# Patient Record
Sex: FEMALE | HISPANIC OR LATINO
[De-identification: names, ages, dates, MRNs, and addresses within clinical notes are randomized per-mention and may not be internally consistent; named-entity substitution may affect disease eponyms.]

---

## 2021-06-18 PROBLEM — Z00.00 ENCOUNTER FOR PREVENTIVE HEALTH EXAMINATION: Status: ACTIVE | Noted: 2021-06-18

## 2021-07-06 ENCOUNTER — APPOINTMENT (OUTPATIENT)
Dept: ENDOCRINOLOGY | Facility: CLINIC | Age: 46
End: 2021-07-06

## 2021-08-03 ENCOUNTER — APPOINTMENT (OUTPATIENT)
Dept: ENDOCRINOLOGY | Facility: CLINIC | Age: 46
End: 2021-08-03
Payer: MEDICAID

## 2021-08-03 VITALS
HEIGHT: 65 IN | SYSTOLIC BLOOD PRESSURE: 122 MMHG | DIASTOLIC BLOOD PRESSURE: 69 MMHG | BODY MASS INDEX: 20.16 KG/M2 | HEART RATE: 71 BPM | WEIGHT: 121 LBS

## 2021-08-03 PROCEDURE — 99072 ADDL SUPL MATRL&STAF TM PHE: CPT

## 2021-08-03 PROCEDURE — 82962 GLUCOSE BLOOD TEST: CPT

## 2021-08-03 PROCEDURE — 99205 OFFICE O/P NEW HI 60 MIN: CPT | Mod: 25

## 2021-08-03 PROCEDURE — 83036 HEMOGLOBIN GLYCOSYLATED A1C: CPT | Mod: QW

## 2021-08-04 LAB
GLUCOSE BLDC GLUCOMTR-MCNC: 290
HBA1C MFR BLD HPLC: 9.6

## 2021-08-05 NOTE — END OF VISIT
[FreeTextEntry3] : All medical record entries made by the Scribe were at my, Dr. Jack Oliveros, direction and personally dictated by me on 08/03/2021. I have reviewed the chart and agree that the record accurately reflects my personal performance of the history, physical exam, assessment and plan. I have also personally directed, reviewed and agreed with the chart.  [Time Spent: ___ minutes] : I have spent [unfilled] minutes of time on the encounter.

## 2021-08-05 NOTE — ADDENDUM
[FreeTextEntry1] : I, Nathaly Porter, acted solely as a scribe for Dr. Jack Oliveros on this date. 08/03/2021.

## 2021-08-05 NOTE — REVIEW OF SYSTEMS
[Dizziness] : dizziness [Weakness] : weakness [Negative] : Heme/Lymph [Blurred Vision] : no blurred vision [Nocturia] : no nocturia [FreeTextEntry2] : weight loss

## 2021-08-05 NOTE — ASSESSMENT
[Importance of Diet and Exercise] : importance of diet and exercise to improve glycemic control, achieve weight loss and improve cardiovascular health [Self Monitoring of Blood Glucose] : self monitoring of blood glucose [Carbohydrate Consistent Diet] : carbohydrate consistent diet [Action and use of Insulin] : action and use of short and long-acting insulin [FreeTextEntry1] : 44 y/o F pt with: \par \par 1. Hx of T2DM, poorly controlled: \par BS fluctuate from 150 and 200. POCT today 290. \par Pt is losing weight.  \par Diabetes treatment goals discussed, including target goals for BP, LDL-c, A1c, and body weight. \par Discussed the importance of BS monitoring, along with targets for pre and post prandial BS. Briefly summarized anti-diabetic medications, including benefits and side effects along with insulin kinetics with pt. \par Emphasized the importance of preventions of DM complication along with cardiomyopathy. \par Recommend pt check BS readings (before and after meals) and continue present DM management, until the assessment of current evaluation is completed. \par Refer pt to see RD and nurse educator for comprehensive DM teaching, including sick days management. \par Recommend the following MDI: Basaglar 25 u qpm, Fiasp 8 u ac +ss as follows:\par BS <100: 0 u\par -150: +1 u\par -200: +2 u\par -250: +3 u\par -300: +4 u \par BS >300: +5 u\par Will order labs today, including A1c, metabolic and lipid profiles. \par \par 2. Thyroid nodules, dx by her internist \par Pt appears to be euthyroid clinically. Currently awaiting thyroid US report. \par \par Return: This Friday

## 2021-08-05 NOTE — PHYSICAL EXAM
[Alert] : alert [No Acute Distress] : no acute distress [Normal Sclera/Conjunctiva] : normal sclera/conjunctiva [Normal Outer Ear/Nose] : the ears and nose were normal in appearance [No Neck Mass] : no neck mass was observed [No Respiratory Distress] : no respiratory distress [Clear to Auscultation] : lungs were clear to auscultation bilaterally [Normal S1, S2] : normal S1 and S2 [Normal Rate] : heart rate was normal [No Edema] : no peripheral edema [Normal Bowel Sounds] : normal bowel sounds [Spine Straight] : spine straight [Normal Gait] : normal gait [Right Foot Was Examined] : right foot ~C was examined [Left Foot Was Examined] : left foot ~C was examined [2+] : 2+ in the dorsalis pedis [Vibration Dec.] : diminished vibratory sensation at the level of the toes [Normal Reflexes] : deep tendon reflexes were 2+ and symmetric [Oriented x3] : oriented to person, place, and time [de-identified] : abdominal lipodystrophy

## 2021-08-05 NOTE — THERAPY
[Today's Date] : [unfilled] [Basaglar] : Basaglar [Flasp] : Flasp [FreeTextEntry9] : 25 u [de-identified] : 8 u

## 2021-08-05 NOTE — HISTORY OF PRESENT ILLNESS
[FreeTextEntry1] : 45 year y/o female pt, with Hx of T2DM (dx age 22) with no known DM related complications, and thyroid nodules presents today to establish endocrine care with me. \par PSHx:  ()\par FHx: DM (uncle)\par Denies FHx of thyroid disorders \par SHx: non-smoker, social ETOH use \par Lifestyle: Walks qd. Eats 3 meals a day. Check BS qd. \par Last funduscopic visit:  \par Last Podiatrist visit: 2021\par NKDA\par Pt has 2 children. Youngest child is 17 y/o. \par Pt received COVID19 vaccine. \par \par 2021\par Pt last saw an endocrinologist 3-4 years ago. Pt has been taking Basaglar for several years. \par Today pt presents with POCT 290, /69, BMI 20.14, feeling tired with c/o occasional dizziness, weakness, and weight loss. Since 2021, pt has noticed she is losing weight. Pt checks BS 1-2 times a week. Today BS is 290. Occasionally BS can be 500s. High BS generally occurs at night. Pt used to have episodes of hypoglycemia at midnight until 2021, when her medications were changed to the current regimen. \par Denies blurry vision, nocturia. \par \par Current Medications: Basaglar 30 u, Admelog 20 u bid(at lunch and dinner), Ramipril, Simvastatin \par \par Labs: \par - 8/3/21: POCT A1c 9.6%\par - 21: s.creat 0.71, LDL-c 80, TSH 0.757\par - 3/24/21: A1c 9.3%

## 2021-08-06 ENCOUNTER — APPOINTMENT (OUTPATIENT)
Dept: ENDOCRINOLOGY | Facility: CLINIC | Age: 46
End: 2021-08-06
Payer: MEDICAID

## 2021-08-06 ENCOUNTER — RESULT CHARGE (OUTPATIENT)
Age: 46
End: 2021-08-06

## 2021-08-06 VITALS
HEART RATE: 86 BPM | WEIGHT: 124 LBS | DIASTOLIC BLOOD PRESSURE: 55 MMHG | BODY MASS INDEX: 20.63 KG/M2 | SYSTOLIC BLOOD PRESSURE: 125 MMHG

## 2021-08-06 PROCEDURE — 99214 OFFICE O/P EST MOD 30 MIN: CPT | Mod: 25

## 2021-08-06 PROCEDURE — 99072 ADDL SUPL MATRL&STAF TM PHE: CPT

## 2021-08-06 PROCEDURE — 82962 GLUCOSE BLOOD TEST: CPT

## 2021-08-06 PROCEDURE — 97802 MEDICAL NUTRITION INDIV IN: CPT

## 2021-08-06 NOTE — PHYSICAL EXAM
[Alert] : alert [No Acute Distress] : no acute distress [Normal Sclera/Conjunctiva] : normal sclera/conjunctiva [Normal Outer Ear/Nose] : the ears and nose were normal in appearance [No Neck Mass] : no neck mass was observed [No Respiratory Distress] : no respiratory distress [Clear to Auscultation] : lungs were clear to auscultation bilaterally [Normal S1, S2] : normal S1 and S2 [Normal Rate] : heart rate was normal [No Edema] : no peripheral edema [Normal Bowel Sounds] : normal bowel sounds [Spine Straight] : spine straight [Normal Gait] : normal gait [Right Foot Was Examined] : right foot ~C was examined [Left Foot Was Examined] : left foot ~C was examined [2+] : 2+ in the dorsalis pedis [Vibration Dec.] : diminished vibratory sensation at the level of the toes [Normal Reflexes] : deep tendon reflexes were 2+ and symmetric [Oriented x3] : oriented to person, place, and time [de-identified] : abdominal lipodystrophy

## 2021-08-06 NOTE — THERAPY
[Today's Date] : [unfilled] [Basaglar] : Basaglar [Flasp] : Flasp [FreeTextEntry9] : 30 u [de-identified] : 11 u ac

## 2021-08-06 NOTE — END OF VISIT
[FreeTextEntry3] : All medical record entries made by the Scribe were at my, Dr. Jack Oliveros, direction and personally dictated by me on 08/06/2021. I have reviewed the chart and agree that the record accurately reflects my personal performance of the history, physical exam, assessment and plan. I have also personally directed, reviewed and agreed with the chart.  [Time Spent: ___ minutes] : I have spent [unfilled] minutes of time on the encounter.

## 2021-08-06 NOTE — ADDENDUM
[FreeTextEntry1] : I, Nathaly Porter, acted solely as a scribe for Dr. Jack Oliveros on this date. 08/06/2021.

## 2021-08-06 NOTE — HISTORY OF PRESENT ILLNESS
[FreeTextEntry1] : 46 y/o female pt, with Hx of T2DM (dx age 22) with no known DM related complications, and thyroid nodules presents today for endocrine f/u.  \par PSHx:  ()\par FHx: DM (uncle)\par Denies FHx of thyroid disorders \par SHx: non-smoker, social ETOH use \par Lifestyle: Walks qd. Eats 3 meals a day. Check BS qd. \par Last funduscopic visit:  \par Last Podiatrist visit: 2021\par NKDA\par Pt has 2 children. Youngest child is 19 y/o. \par Pt received COVID19 vaccine. \par \par 2021\par Pt last saw an endocrinologist 3-4 years ago. Pt has been taking Basaglar for several years. \par Today pt presents with POCT 290, /69, BMI 20.14, feeling tired with c/o occasional dizziness, weakness, and weight loss. Since 2021, pt has noticed she is losing weight. Pt checks BS 1-2 times a week. Today BS is 290. Occasionally BS can be 500s. High BS generally occurs at night. Pt used to have episodes of hypoglycemia at midnight until 2021, when her medications were changed to the current regimen. \par Denies blurry vision, nocturia. \par \par 21\par Today pt presents accompanied by family member with POCT 257, /55, BMI 20.63, feeling well. Pt eats small meal portions. For dinner, pt may eat a plantain. \par Pt brought BS records. \par - FBS: 129, 264\par - 3 hours after breakfast BS: 251 \par - 4 hours after lunch BS: 410 \par Pt has been on insulin for 23 years. \par \par Current Medications: Basaglar 30 u, Admelog 20 u bid(at lunch and dinner), Ramipril, Simvastatin \par \par Labs: \par - 8/3/21: POCT A1c 9.6%\par - 21: s.creat 0.71, LDL-c 80, TSH 0.757\par - 3/24/21: A1c 9.3%

## 2021-08-06 NOTE — ASSESSMENT
[Carbohydrate Consistent Diet] : carbohydrate consistent diet [Importance of Diet and Exercise] : importance of diet and exercise to improve glycemic control, achieve weight loss and improve cardiovascular health [Action and use of Insulin] : action and use of short and long-acting insulin [Self Monitoring of Blood Glucose] : self monitoring of blood glucose [FreeTextEntry1] : 46 y/o F pt with: \par \par 1. Longstanding Hx of T2DM(dx 23 years ago), poorly controlled: \par Continue working on assessment of complications. \par Pt sees severe fluctuations in BS. -220s. PPBS in 300s. Pt states that she eats small food portions.\par Diabetes treatment explained including impact of carbohydrate intake and lack of physical activity. \par Recommend pt to see podiatrist and ophthalmologist. \par Today pt will discuss her nutrition with RD. \par Will increase MDI insulin by 20%. \par Sending labs today \par \par Return: 4 months.

## 2021-08-18 LAB — GLUCOSE BLDC GLUCOMTR-MCNC: 257

## 2021-08-18 RX ORDER — FLASH GLUCOSE SCANNING READER
EACH MISCELLANEOUS
Qty: 1 | Refills: 0 | Status: ACTIVE | COMMUNITY
Start: 2021-08-06 | End: 1900-01-01

## 2021-12-09 ENCOUNTER — APPOINTMENT (OUTPATIENT)
Dept: ENDOCRINOLOGY | Facility: CLINIC | Age: 46
End: 2021-12-09
Payer: MEDICAID

## 2021-12-09 VITALS
WEIGHT: 124 LBS | BODY MASS INDEX: 20.63 KG/M2 | SYSTOLIC BLOOD PRESSURE: 127 MMHG | DIASTOLIC BLOOD PRESSURE: 65 MMHG | HEART RATE: 76 BPM

## 2021-12-09 PROCEDURE — 82962 GLUCOSE BLOOD TEST: CPT

## 2021-12-09 PROCEDURE — 99072 ADDL SUPL MATRL&STAF TM PHE: CPT

## 2021-12-09 PROCEDURE — 99214 OFFICE O/P EST MOD 30 MIN: CPT | Mod: 25

## 2021-12-10 LAB — GLUCOSE BLDC GLUCOMTR-MCNC: 187

## 2021-12-10 NOTE — ADDENDUM
[FreeTextEntry1] : I Marce Blu act soley as a scribe for Dr. Jack Oliveros on this date. 12/09/2021

## 2021-12-10 NOTE — REVIEW OF SYSTEMS
[As Noted in HPI] : as noted in HPI [Fatigue] : fatigue [Negative] : Heme/Lymph [Recent Weight Gain (___ Lbs)] : no recent weight gain [Recent Weight Loss (___ Lbs)] : no recent weight loss [Polyuria] : no polyuria [Dysuria] : no dysuria [Nocturia] : no nocturia

## 2021-12-10 NOTE — END OF VISIT
[FreeTextEntry3] : All medical record entries made by the Scribe were at my, Dr. Jack Oliveros, direction and personally dictated by me on 12/09/2021. I have reviewed the chart and agree that the record accurately reflects my personal performance of the history, physical exam, assessment and plan. I have also personally directed, reviewed and agreed with the chart.  [Time Spent: ___ minutes] : I have spent [unfilled] minutes of time on the encounter.

## 2021-12-10 NOTE — PHYSICAL EXAM
[Alert] : alert [No Acute Distress] : no acute distress [Normal Sclera/Conjunctiva] : normal sclera/conjunctiva [Normal Outer Ear/Nose] : the ears and nose were normal in appearance [No Neck Mass] : no neck mass was observed [No Respiratory Distress] : no respiratory distress [Clear to Auscultation] : lungs were clear to auscultation bilaterally [Normal S1, S2] : normal S1 and S2 [Normal Rate] : heart rate was normal [No Edema] : no peripheral edema [Normal Bowel Sounds] : normal bowel sounds [Spine Straight] : spine straight [Normal Gait] : normal gait [No Rash] : no rash [2+] : 2+ in the dorsalis pedis [Vibration Dec.] : diminished vibratory sensation at the level of the toes [Normal Reflexes] : deep tendon reflexes were 2+ and symmetric [Oriented x3] : oriented to person, place, and time [de-identified] : Left lobe nodularities [de-identified] : abdominal lipodystrophy

## 2021-12-10 NOTE — THERAPY
[Today's Date] : [unfilled] [Basaglar] : Basaglar [Admelog] : Admelog [FreeTextEntry9] : 25 u  [de-identified] : 7 u ac

## 2021-12-10 NOTE — HISTORY OF PRESENT ILLNESS
[FreeTextEntry1] : 45 y/o female pt, with Hx of T2DM (dx age 22) with DM related complications such as peripheral neuropathy and "proteinuria", and thyroid nodules presents today for endocrine f/u. No hx of CAD. \par Other PMHx: UTI (2021)\par PSHx:  ()\par FHx: DM (uncle)\par Denies FHx of thyroid disorders \par SHx: non-smoker, social ETOH use \par Lifestyle: Walks qd. Eats 3 meals a day. Check BS qd. \par Last funduscopic visit:  \par Last Podiatrist visit: 2021\par NKDA\par Pt has 2 children. Youngest child is 17 y/o. \par Pt received COVID19 vaccine. \par \par 2021\par Pt last saw an endocrinologist 3-4 years ago. Pt has been taking Basaglar for several years. \par Today pt presents with POCT 290, /69, BMI 20.14, feeling tired with c/o occasional dizziness, weakness, and weight loss. Since 2021, pt has noticed she is losing weight. Pt checks BS 1-2 times a week. Today BS is 290. Occasionally BS can be 500s. High BS generally occurs at night. Pt used to have episodes of hypoglycemia at midnight until 2021, when her medications were changed to the current regimen. \par Denies blurry vision, nocturia. \par \par 21\par Today pt presents accompanied by family member with POCT 257, /55, BMI 20.63, feeling well. Pt eats small meal portions. For dinner, pt may eat a plantain. \par Pt brought BS records. \par - FBS: 129, 264\par - 3 hours after breakfast BS: 251 \par - 4 hours after lunch BS: 410 \par Pt has been on insulin for 23 years. \par \par 2021\par Pt presents today for DM f/u with POCT 187, /65 and BMI 20.63. No weight changes since last visit.  \par Pt was previously diagnosed with peripheral neuropathy and proteinuria as per pt. No Hx of CAD. She was diagnosed with UTI last week. \par Pt is feeling good, with c/o fatigue. She denies other physical complaints such as osmotic diuresis symptoms. \par Her DM has been poorly controlled. She is on MDI. \par BS: 200-300\par \par \par Current Medications: Basaglar 25 u, Admelog 4u for breakfast, 16u for lunch, 10 for dinner. Ramipril, Simvastatin \par \par Labs: \par - 8/3/21: POCT A1c 9.6%\par - 21: s.creat 0.71, LDL-c 80, TSH 0.757\par - 3/24/21: A1c 9.3% \par \par

## 2021-12-10 NOTE — ASSESSMENT
[Self Monitoring of Blood Glucose] : self monitoring of blood glucose [FreeTextEntry1] : 45 y/o F pt with: \par \par 1. Hx of T2DM(dx 23 years ago), poorly controlled: \par Has history of retinopathy and proteinuria. No past medical history of CAD. \par Her A1c is 9.6% in 08/2021. At her initial visit, sites of injections were changed due to lipodystrophy. She is not following the DM regimen as prescribed. She has light breakfast and large lunch. \par DM treatment goals were explained and recommended modification of lifestyle and diet. She has already seen her RD. Avoid sweet beverages and delaying meals.\par Recommend basal insulin 25 u and prandial insulin 7 u ac. Pt can increase up to 12 u for an unplanned large meals.\par F/u with ophthalmologist and PCP. \par Bring FBS and PPBS records.  \par \par Return: 1 month.  [Carbohydrate Consistent Diet] : carbohydrate consistent diet [Importance of Diet and Exercise] : importance of diet and exercise to improve glycemic control, achieve weight loss and improve cardiovascular health [Exercise/Effect on Glucose] : exercise/effect on glucose

## 2022-02-14 ENCOUNTER — APPOINTMENT (OUTPATIENT)
Dept: ENDOCRINOLOGY | Facility: CLINIC | Age: 47
End: 2022-02-14
Payer: MEDICAID

## 2022-02-14 VITALS
BODY MASS INDEX: 21.16 KG/M2 | WEIGHT: 127 LBS | DIASTOLIC BLOOD PRESSURE: 71 MMHG | HEART RATE: 75 BPM | SYSTOLIC BLOOD PRESSURE: 128 MMHG | HEIGHT: 65 IN

## 2022-02-14 LAB — GLUCOSE BLDC GLUCOMTR-MCNC: 171

## 2022-02-14 PROCEDURE — 99072 ADDL SUPL MATRL&STAF TM PHE: CPT

## 2022-02-14 PROCEDURE — 82962 GLUCOSE BLOOD TEST: CPT

## 2022-02-14 PROCEDURE — 99214 OFFICE O/P EST MOD 30 MIN: CPT | Mod: 25

## 2022-02-18 NOTE — END OF VISIT
[FreeTextEntry3] : All medical record entries made by the Scribe were at my, Dr. Jack Oliveros, direction and personally dictated by me on 02/14/2022. I have reviewed the chart and agree that the record accurately reflects my personal performance of the history, physical exam, assessment and plan. I have also personally directed, reviewed and agreed with the chart.  [Time Spent: ___ minutes] : I have spent [unfilled] minutes of time on the encounter.

## 2022-02-18 NOTE — PHYSICAL EXAM
[Alert] : alert [No Acute Distress] : no acute distress [Normal Sclera/Conjunctiva] : normal sclera/conjunctiva [Normal Outer Ear/Nose] : the ears and nose were normal in appearance [No Respiratory Distress] : no respiratory distress [Clear to Auscultation] : lungs were clear to auscultation bilaterally [Normal Rate] : heart rate was normal [No Edema] : no peripheral edema [Normal Bowel Sounds] : normal bowel sounds [Spine Straight] : spine straight [Normal Gait] : normal gait [No Rash] : no rash [2+] : 2+ in the dorsalis pedis [Vibration Dec.] : diminished vibratory sensation at the level of the toes [Normal Reflexes] : deep tendon reflexes were 2+ and symmetric [Oriented x3] : oriented to person, place, and time [Regular Rhythm] : with a regular rhythm [de-identified] : Left lobe nodularities palpated.  [de-identified] : abdominal lipodystrophy

## 2022-02-18 NOTE — ADDENDUM
[FreeTextEntry1] : I Kieranhoward Hurt act soley as a scribe for Dr. Jack Oliveros on this date. 02/14/2022

## 2022-02-18 NOTE — REVIEW OF SYSTEMS
[Recent Weight Gain (___ Lbs)] : recent weight gain: [unfilled] lbs [As Noted in HPI] : as noted in HPI [Joint Pain] : joint pain [Negative] : Constitutional [Polyuria] : no polyuria [Nocturia] : no nocturia [FreeTextEntry9] : Pt's joints are bothering her. Her hands and lower extremities are tender.

## 2022-02-18 NOTE — ASSESSMENT
[FreeTextEntry1] : 45 y/o F pt with: \par \par 1. Hx of T2DM (dx 23 years ago): \par At her initial visit in 08/2021, her A1c was 9.6%. Over the past few months, the pt has been interested in her DM management and has modified her diet and lifestyle. \par Pt is doing very well clinically. Her FBS range between . \par A recent C-peptide from 01/07/22 was <0.1 with a Glucose of 101. This indicates that after 2 decades with DM, she has exhausted her pancreas. \par Her POCT A1c  today was 9.2%. A way to explain this high A1c is that her PPBS are in the 300s (she only checks her FBS). Therefore, the pt will continue with the same MDI. Recommend to measure her pre and post prandial BS at home.\par Send a copy of her glucose log to change her regimen accordingly. \par For her neuropathy symptoms, we will initiate Cymbalta 30 mg. Continue Simvastatin. \par \par Return: 4 months.  [Carbohydrate Consistent Diet] : carbohydrate consistent diet [Importance of Diet and Exercise] : importance of diet and exercise to improve glycemic control, achieve weight loss and improve cardiovascular health [Self Monitoring of Blood Glucose] : self monitoring of blood glucose

## 2022-02-18 NOTE — THERAPY
[Today's Date] : [unfilled] [Basaglar] : Basaglar [Admelog] : Admelog [FreeTextEntry9] : 25 u  [de-identified] : 7 u ac [FreeTextEntry7] : Simvastatin

## 2022-02-18 NOTE — DATA REVIEWED
[FreeTextEntry1] : Imaging:\par - 07/30/21 Thyroid neck US: R lobe (measuring 5.2 x 1.1 x 2.0 cm, slightly heterogeneous) is without discrete nodules. Isthmus measures 0.1 cm and is normal in size. L lobe (measuring 4.5 x 1.0 x 1.3 cm, slightly heterogeneous) is without discrete nodules. Impression: normal thyroid sonogram without nodules. \par \par Labs: \par - 01/07/22: s.creat 0.85, Glucose 101 (H), serum C-peptide <0.1 (1.1-4.4), Total bilirubin 1.3 (H)\par - 08/03/21: POCT A1c 9.6%\par - 07/21/21: s.creat 0.87, Glucose 336 (H)\par - 05/26/21: s.creat 0.71, LDL-c 80, TSH 0.757\par - 03/24/21: A1c 9.3%, s.creat 0.71, Glucose 179 (H), serum albumin 3.7 (L)\par

## 2022-03-26 LAB — HBA1C MFR BLD HPLC: 9.2

## 2022-06-13 ENCOUNTER — APPOINTMENT (OUTPATIENT)
Dept: ENDOCRINOLOGY | Facility: CLINIC | Age: 47
End: 2022-06-13

## 2022-06-15 ENCOUNTER — APPOINTMENT (OUTPATIENT)
Dept: ENDOCRINOLOGY | Facility: CLINIC | Age: 47
End: 2022-06-15
Payer: MEDICAID

## 2022-06-15 VITALS
HEART RATE: 74 BPM | DIASTOLIC BLOOD PRESSURE: 77 MMHG | SYSTOLIC BLOOD PRESSURE: 127 MMHG | BODY MASS INDEX: 21.97 KG/M2 | WEIGHT: 132 LBS

## 2022-06-15 PROCEDURE — 99214 OFFICE O/P EST MOD 30 MIN: CPT | Mod: 25

## 2022-06-15 PROCEDURE — 82962 GLUCOSE BLOOD TEST: CPT

## 2022-06-17 LAB — GLUCOSE BLDC GLUCOMTR-MCNC: 162

## 2022-06-17 NOTE — ADDENDUM
[FreeTextEntry1] : I Marce Blu act soley as a scribe for Dr. Jack Oliveros on this date. 06/15/2022  none

## 2022-06-17 NOTE — END OF VISIT
[FreeTextEntry3] : All medical record entries made by the Scribe were at my, Dr. Jack Oliveros, direction and personally dictated by me on 06/15/2022. I have reviewed the chart and agree that the record accurately reflects my personal performance of the history, physical exam, assessment and plan. I have also personally directed, reviewed and agreed with the chart.  [Time Spent: ___ minutes] : I have spent [unfilled] minutes of time on the encounter.

## 2022-06-17 NOTE — PHYSICAL EXAM
[Alert] : alert [No Acute Distress] : no acute distress [Normal Sclera/Conjunctiva] : normal sclera/conjunctiva [Normal Outer Ear/Nose] : the ears and nose were normal in appearance [No Respiratory Distress] : no respiratory distress [Clear to Auscultation] : lungs were clear to auscultation bilaterally [Normal Rate] : heart rate was normal [Regular Rhythm] : with a regular rhythm [No Edema] : no peripheral edema [Normal Bowel Sounds] : normal bowel sounds [Spine Straight] : spine straight [Normal Gait] : normal gait [No Rash] : no rash [2+] : 2+ in the dorsalis pedis [Vibration Dec.] : diminished vibratory sensation at the level of the toes [Normal Reflexes] : deep tendon reflexes were 2+ and symmetric [Oriented x3] : oriented to person, place, and time [No Neck Mass] : no neck mass was observed [No Tremors] : no tremors [Acanthosis Nigricans] : no acanthosis nigricans [de-identified] : abdominal lipodystrophy

## 2022-06-17 NOTE — ASSESSMENT
[FreeTextEntry1] : 47 y/o F pt with: \par \par 1. T2DM diagnosed > 20 years ago: \par January 7, 2022 C-peptide less than 0.1. 2/14/22 A1c 9.2%\par Overall, pt is feeling and doing better. FBS are consistently below 130 with no hypoglycemias.PPBS after dinner continues high. Her neuropathy symptoms have improved significantly. \par Recommend to continue with Basaglar 25 u, increase Admelog to 8/8/10 u ac.\par Peripheral neuropathy, symptoms improved with duloxetine \par Cymbalta 30 mg, and continue statins\par Send labs today. Will contact pt with results.  \par \par Return in: 4 months.  [Carbohydrate Consistent Diet] : carbohydrate consistent diet [Importance of Diet and Exercise] : importance of diet and exercise to improve glycemic control, achieve weight loss and improve cardiovascular health [Exercise/Effect on Glucose] : exercise/effect on glucose [Self Monitoring of Blood Glucose] : self monitoring of blood glucose

## 2022-06-17 NOTE — REVIEW OF SYSTEMS
[As Noted in HPI] : as noted in HPI [Recent Weight Gain (___ Lbs)] : recent weight gain: [unfilled] lbs [Negative] : Musculoskeletal [FreeTextEntry2] : She gained 5 lbs in 4 months.

## 2022-06-17 NOTE — THERAPY
[Today's Date] : [unfilled] [Basaglar] : Basaglar [Admelog] : Admelog [FreeTextEntry9] : 25 u  [de-identified] : 8/8/10 u ac [FreeTextEntry7] : Simvastatin

## 2022-06-25 ENCOUNTER — TRANSCRIPTION ENCOUNTER (OUTPATIENT)
Age: 47
End: 2022-06-25

## 2022-10-06 ENCOUNTER — APPOINTMENT (OUTPATIENT)
Dept: ENDOCRINOLOGY | Facility: CLINIC | Age: 47
End: 2022-10-06

## 2022-10-06 VITALS
DIASTOLIC BLOOD PRESSURE: 61 MMHG | WEIGHT: 138 LBS | HEART RATE: 79 BPM | SYSTOLIC BLOOD PRESSURE: 128 MMHG | BODY MASS INDEX: 22.96 KG/M2

## 2022-10-06 PROCEDURE — 99214 OFFICE O/P EST MOD 30 MIN: CPT | Mod: 25

## 2022-10-06 PROCEDURE — 82962 GLUCOSE BLOOD TEST: CPT

## 2022-10-06 RX ORDER — INSULIN ASPART INJECTION 100 [IU]/ML
100 INJECTION, SOLUTION SUBCUTANEOUS
Qty: 1 | Refills: 2 | Status: DISCONTINUED | COMMUNITY
Start: 2021-08-19 | End: 2022-10-06

## 2022-10-06 NOTE — THERAPY
[Today's Date] : [unfilled] [Basaglar] : Basaglar [Admelog] : Admelog [FreeTextEntry9] : 18 u  [de-identified] : 8 u ac +ss: \par BS <100: +0 u\par -150: + 2 u\par -200: + 3 u\par -250: + 5 u\par BS Above 250: + 7 u \par  [FreeTextEntry7] : Atorvastatin 20 mg

## 2022-10-06 NOTE — PHYSICAL EXAM
[Alert] : alert [No Acute Distress] : no acute distress [Normal Sclera/Conjunctiva] : normal sclera/conjunctiva [Normal Outer Ear/Nose] : the ears and nose were normal in appearance [No Neck Mass] : no neck mass was observed [No Respiratory Distress] : no respiratory distress [Clear to Auscultation] : lungs were clear to auscultation bilaterally [Normal Rate] : heart rate was normal [Regular Rhythm] : with a regular rhythm [No Edema] : no peripheral edema [Normal Bowel Sounds] : normal bowel sounds [Spine Straight] : spine straight [Normal Gait] : normal gait [No Rash] : no rash [2+] : 2+ in the dorsalis pedis [Vibration Dec.] : diminished vibratory sensation at the level of the toes [Normal Reflexes] : deep tendon reflexes were 2+ and symmetric [No Tremors] : no tremors [Oriented x3] : oriented to person, place, and time [Acanthosis Nigricans] : no acanthosis nigricans [de-identified] : abdominal lipodystrophy

## 2022-10-06 NOTE — DATA REVIEWED
[FreeTextEntry1] : Labs: \par - 01/07/22: s.creat 0.85, Glucose 101 (H), serum C-peptide <0.1 (1.1-4.4), Total bilirubin 1.3 (H)\par - 08/03/21: POCT A1c 9.6%\par - 07/21/21: s.creat 0.87, Glucose 336 (H)\par - 05/26/21: s.creat 0.71, LDL-c 80, TSH 0.757\par - 03/24/21: A1c 9.3%, s.creat 0.71, Glucose 179 (H), serum albumin 3.7 (L)\par \par Imaging:\par - 07/30/21 Thyroid neck US: R lobe (measuring 5.2 x 1.1 x 2.0 cm, slightly heterogeneous) is without discrete nodules. Isthmus measures 0.1 cm and is normal in size. L lobe (measuring 4.5 x 1.0 x 1.3 cm, slightly heterogeneous) is without discrete nodules. Impression: normal thyroid sonogram without nodules. \par

## 2022-10-06 NOTE — ADDENDUM
[FreeTextEntry1] : I Kieranhoward Hurt act soley as a scribe for Dr. Jack Oliveros on this date. 10/06/2022

## 2022-10-06 NOTE — HISTORY OF PRESENT ILLNESS
[Continuous Glucose Monitoring] : Continuous Glucose Monitoring: Yes [Sha] : Sha [Hypoglycemia] : Patient is hypoglycemic. [FreeTextEntry1] : ~ 4 times a month.

## 2022-10-06 NOTE — ASSESSMENT
[Carbohydrate Consistent Diet] : carbohydrate consistent diet [Importance of Diet and Exercise] : importance of diet and exercise to improve glycemic control, achieve weight loss and improve cardiovascular health [Exercise/Effect on Glucose] : exercise/effect on glucose [Self Monitoring of Blood Glucose] : self monitoring of blood glucose

## 2022-10-06 NOTE — END OF VISIT
[FreeTextEntry3] : All medical record entries made by the Scribe were at my, Dr. Jack Oliveros, direction and personally dictated by me on 10/06/2022. I have reviewed the chart and agree that the record accurately reflects my personal performance of the history, physical exam, assessment and plan. I have also personally directed, reviewed and agreed with the chart.  [Time Spent: ___ minutes] : I have spent [unfilled] minutes of time on the encounter.

## 2022-10-06 NOTE — REVIEW OF SYSTEMS
[As Noted in HPI] : as noted in HPI [Recent Weight Gain (___ Lbs)] : recent weight gain: [unfilled] lbs [Negative] : Heme/Lymph [Recent Weight Loss (___ Lbs)] : no recent weight loss [Blurred Vision] : no blurred vision [Shortness Of Breath] : no shortness of breath [FreeTextEntry2] : She gained 6 lbs in 4 months.

## 2022-11-07 LAB
ALBUMIN SERPL ELPH-MCNC: 4.3 G/DL
ALP BLD-CCNC: 67 U/L
ALT SERPL-CCNC: 39 U/L
ANION GAP SERPL CALC-SCNC: 12 MMOL/L
AST SERPL-CCNC: 26 U/L
BILIRUB SERPL-MCNC: 1 MG/DL
BUN SERPL-MCNC: 11 MG/DL
CALCIUM SERPL-MCNC: 9.3 MG/DL
CHLORIDE SERPL-SCNC: 99 MMOL/L
CHOLEST SERPL-MCNC: 233 MG/DL
CO2 SERPL-SCNC: 26 MMOL/L
CREAT SERPL-MCNC: 0.57 MG/DL
CREAT SPEC-SCNC: 86 MG/DL
EGFR: 113 ML/MIN/1.73M2
ESTIMATED AVERAGE GLUCOSE: 255 MG/DL
GLUCOSE BLDC GLUCOMTR-MCNC: 85
GLUCOSE SERPL-MCNC: 88 MG/DL
HBA1C MFR BLD HPLC: 10.5 %
HDLC SERPL-MCNC: 80 MG/DL
LDLC SERPL CALC-MCNC: 138 MG/DL
MICROALBUMIN 24H UR DL<=1MG/L-MCNC: 1.9 MG/DL
MICROALBUMIN/CREAT 24H UR-RTO: 22 MG/G
NONHDLC SERPL-MCNC: 153 MG/DL
POTASSIUM SERPL-SCNC: 4 MMOL/L
PROT SERPL-MCNC: 7.5 G/DL
SODIUM SERPL-SCNC: 137 MMOL/L
TRIGL SERPL-MCNC: 75 MG/DL

## 2022-11-22 ENCOUNTER — APPOINTMENT (OUTPATIENT)
Dept: ENDOCRINOLOGY | Facility: CLINIC | Age: 47
End: 2022-11-22

## 2022-11-22 PROCEDURE — 99213 OFFICE O/P EST LOW 20 MIN: CPT | Mod: 95

## 2022-11-22 NOTE — PHYSICAL EXAM
[Alert] : alert [No Acute Distress] : no acute distress [Normal Sclera/Conjunctiva] : normal sclera/conjunctiva [Normal Outer Ear/Nose] : the ears and nose were normal in appearance [No Neck Mass] : no neck mass was observed [No Respiratory Distress] : no respiratory distress [Clear to Auscultation] : lungs were clear to auscultation bilaterally [Normal Rate] : heart rate was normal [Regular Rhythm] : with a regular rhythm [No Edema] : no peripheral edema [Normal Bowel Sounds] : normal bowel sounds [Spine Straight] : spine straight [Normal Gait] : normal gait [No Rash] : no rash [2+] : 2+ in the dorsalis pedis [Vibration Dec.] : diminished vibratory sensation at the level of the toes [Normal Reflexes] : deep tendon reflexes were 2+ and symmetric [No Tremors] : no tremors [Oriented x3] : oriented to person, place, and time [Acanthosis Nigricans] : no acanthosis nigricans [de-identified] : abdominal lipodystrophy

## 2022-11-25 NOTE — THERAPY
[Today's Date] : [unfilled] [Basaglar] : Basaglar [Admelog] : Admelog [FreeTextEntry9] : 18 u  [de-identified] : 8 u ac +ss: \par BS <100: +0 u\par -150: + 2 u\par -200: + 3 u\par -250: + 5 u\par BS Above 250: + 7 u \par  [FreeTextEntry7] : Atorvastatin 20 mg

## 2022-11-25 NOTE — HISTORY OF PRESENT ILLNESS
[Continuous Glucose Monitoring] : Continuous Glucose Monitoring: Yes [Sha] : Sha [Hypoglycemia] : Patient is hypoglycemic. [Home] : at home, [unfilled] , at the time of the visit. [Medical Office: ([facility])___] : at the medical office located in  [FreeTextEntry1] : ~ 4 times a month.

## 2022-11-25 NOTE — END OF VISIT
[FreeTextEntry3] : All medical record entries made by the Scribe were at my, Dr. Jack Oliveros, direction and personally dictated by me on 11/22/2022. I have reviewed the chart and agree that the record accurately reflects my personal performance of the history, physical exam, assessment and plan. I have also personally, directed, reviewed and agreed with the chart  [Time Spent: ___ minutes] : I have spent [unfilled] minutes of time on the encounter.

## 2022-11-25 NOTE — ADDENDUM
[FreeTextEntry1] : I, Leila Velarde, acted solely as a scribe for Dr. Jack Oliveros on this date 11/22/2022

## 2023-03-13 ENCOUNTER — APPOINTMENT (OUTPATIENT)
Dept: ENDOCRINOLOGY | Facility: CLINIC | Age: 48
End: 2023-03-13
Payer: MEDICAID

## 2023-03-13 VITALS
SYSTOLIC BLOOD PRESSURE: 135 MMHG | BODY MASS INDEX: 21.33 KG/M2 | WEIGHT: 128 LBS | HEART RATE: 83 BPM | DIASTOLIC BLOOD PRESSURE: 73 MMHG | HEIGHT: 65 IN

## 2023-03-13 PROCEDURE — 99214 OFFICE O/P EST MOD 30 MIN: CPT | Mod: 25

## 2023-03-13 PROCEDURE — 83036 HEMOGLOBIN GLYCOSYLATED A1C: CPT | Mod: QW

## 2023-03-13 PROCEDURE — 82962 GLUCOSE BLOOD TEST: CPT

## 2023-03-13 RX ORDER — DULOXETINE HYDROCHLORIDE 30 MG/1
30 CAPSULE, DELAYED RELEASE PELLETS ORAL
Qty: 30 | Refills: 2 | Status: ACTIVE | COMMUNITY
Start: 2022-02-14 | End: 1900-01-01

## 2023-03-14 NOTE — END OF VISIT
[FreeTextEntry3] : All medical record entries made by the Scribe were at my, Dr. Jack Oliveros, direction and personally dictated by me on 03/13/2023. I have reviewed the chart and agree that the record accurately reflects my personal performance of the history, physical exam, assessment and plan. I have also personally directed, reviewed and agreed with the chart.  [Time Spent: ___ minutes] : I have spent [unfilled] minutes of time on the encounter.

## 2023-03-14 NOTE — REVIEW OF SYSTEMS
[Negative] : Heme/Lymph [Recent Weight Loss (___ Lbs)] : recent weight loss: [unfilled] lbs [As Noted in HPI] : as noted in HPI [Stress] : stress [Blurred Vision] : no blurred vision [Shortness Of Breath] : no shortness of breath [Nocturia] : no nocturia [FreeTextEntry2] : She lost 10 lbs in 5 months.

## 2023-03-14 NOTE — THERAPY
[Today's Date] : [unfilled] [Basaglar] : Basaglar [Admelog] : Admelog [FreeTextEntry9] : 12 u  [de-identified] : 7u ac +ss: Glucose between: 101-150: 2 units, 151-200: 3 units,  201-250: 5 units,  251-300: 6 units, above 300: 8 units \par BS <100: +0 u\par -150: + 2 u\par -200: + 3 u\par -250: + 5 u\par BS Above 250: + 7 u \par  [FreeTextEntry7] : Atorvastatin 20 mg

## 2023-03-14 NOTE — ADDENDUM
[FreeTextEntry1] : I, Zulema Perry act soley as a scribe for Dr. Jack Oliveros on this date. 03/13/2023

## 2023-03-14 NOTE — DATA REVIEWED
[FreeTextEntry1] : Scanned Labs: \par - 01/07/22: s.creat 0.85, Glucose 101 (H), serum C-peptide <0.1 (1.1-4.4), Total bilirubin 1.3 (H)\par - 07/21/21: s.creat 0.87, Glucose 336 (H)\par - 05/26/21: s.creat 0.71, LDL-c 80, TSH 0.757\par \par \par Imaging:\par - 07/30/21 Thyroid neck US: R lobe (measuring 5.2 x 1.1 x 2.0 cm, slightly heterogeneous) is without discrete nodules. Isthmus measures 0.1 cm and is normal in size. L lobe (measuring 4.5 x 1.0 x 1.3 cm, slightly heterogeneous) is without discrete nodules. Impression: normal thyroid sonogram without nodules. \par

## 2023-03-15 LAB
ANION GAP SERPL CALC-SCNC: 12 MMOL/L
BUN SERPL-MCNC: 13 MG/DL
C PEPTIDE SERPL-MCNC: <0.1 NG/ML
CALCIUM SERPL-MCNC: 10 MG/DL
CHLORIDE SERPL-SCNC: 101 MMOL/L
CHOLEST SERPL-MCNC: 152 MG/DL
CO2 SERPL-SCNC: 26 MMOL/L
CREAT SERPL-MCNC: 0.79 MG/DL
CREAT SPEC-SCNC: 82 MG/DL
EGFR: 92 ML/MIN/1.73M2
ESTIMATED AVERAGE GLUCOSE: 209 MG/DL
GLUCOSE BLDC GLUCOMTR-MCNC: 56
GLUCOSE SERPL-MCNC: 104 MG/DL
HBA1C MFR BLD HPLC: 8.9
HBA1C MFR BLD HPLC: 8.9 %
HDLC SERPL-MCNC: 65 MG/DL
LDLC SERPL CALC-MCNC: 66 MG/DL
MICROALBUMIN 24H UR DL<=1MG/L-MCNC: <1.2 MG/DL
MICROALBUMIN/CREAT 24H UR-RTO: NORMAL MG/G
NONHDLC SERPL-MCNC: 87 MG/DL
POTASSIUM SERPL-SCNC: 4.4 MMOL/L
SODIUM SERPL-SCNC: 139 MMOL/L
TRIGL SERPL-MCNC: 103 MG/DL
TSH SERPL-ACNC: 1.38 UIU/ML

## 2023-03-17 ENCOUNTER — APPOINTMENT (OUTPATIENT)
Dept: ENDOCRINOLOGY | Facility: CLINIC | Age: 48
End: 2023-03-17

## 2023-07-27 ENCOUNTER — NON-APPOINTMENT (OUTPATIENT)
Age: 48
End: 2023-07-27

## 2023-07-27 ENCOUNTER — APPOINTMENT (OUTPATIENT)
Dept: ENDOCRINOLOGY | Facility: CLINIC | Age: 48
End: 2023-07-27
Payer: MEDICAID

## 2023-07-27 VITALS
DIASTOLIC BLOOD PRESSURE: 76 MMHG | HEIGHT: 65 IN | WEIGHT: 136 LBS | BODY MASS INDEX: 22.66 KG/M2 | HEART RATE: 75 BPM | SYSTOLIC BLOOD PRESSURE: 130 MMHG

## 2023-07-27 LAB — GLUCOSE BLDC GLUCOMTR-MCNC: 176

## 2023-07-27 PROCEDURE — 82962 GLUCOSE BLOOD TEST: CPT

## 2023-07-27 PROCEDURE — 99214 OFFICE O/P EST MOD 30 MIN: CPT | Mod: 25

## 2023-07-27 NOTE — THERAPY
[FreeTextEntry9] : 12 u  [de-identified] : 7u ac +ss: Glucose between: 101-150: 2 units, 151-200: 3 units,  201-250: 5 units,  251-300: 6 units, above 300: 8 units \par BS <100: +0 u\par -150: + 2 u\par -200: + 3 u\par -250: + 5 u\par BS Above 250: + 7 u \par  [FreeTextEntry7] : Atorvastatin 20 mg

## 2023-07-27 NOTE — REVIEW OF SYSTEMS
[Blurred Vision] : no blurred vision [Shortness Of Breath] : no shortness of breath [Nocturia] : no nocturia [FreeTextEntry2] : She gained 8 lbs in 4 months

## 2023-07-27 NOTE — DATA REVIEWED
[FreeTextEntry1] : Scanned Labs: \par - 01/07/22: s.creat 0.85, Glucose 101 (H), serum C-peptide <0.1 (1.1-4.4), Total bilirubin 1.3 (H)\par - 07/21/21: s.creat 0.87, Glucose 336 (H)\par - 05/26/21: s.creat 0.71, LDL-c 80, TSH 0.757\par \par Imaging:\par - 07/30/21 Thyroid neck US: R lobe (measuring 5.2 x 1.1 x 2.0 cm, slightly heterogeneous) is without discrete nodules. Isthmus measures 0.1 cm and is normal in size. L lobe (measuring 4.5 x 1.0 x 1.3 cm, slightly heterogeneous) is without discrete nodules. Impression: normal thyroid sonogram without nodules. \par

## 2023-07-27 NOTE — PHYSICAL EXAM
[Acanthosis Nigricans] : no acanthosis nigricans
Manuel Moura)  Cardiovascular Disease; Internal Medicine  [address] [place] [address]  Phone: [phone]  Fax: [phone]  Established Patient  Follow Up Time: 7-10 Days

## 2023-07-27 NOTE — ADDENDUM
[FreeTextEntry1] : I, Tiffany Gonsalves, act soley as a scribe for Dr. Jack Oliveros on this date. 07/27/2023

## 2023-07-27 NOTE — END OF VISIT
[FreeTextEntry3] : All medical record entries made by the Scribe were at my, Dr. Jack Oliveros, direction and personally dictated by me on 07/27/2023. I have reviewed the chart and agree that the record accurately reflects my personal performance of the history, physical exam, assessment and plan. I have also personally directed, reviewed and agreed with the chart.

## 2023-08-02 LAB
ANION GAP SERPL CALC-SCNC: 12 MMOL/L
BUN SERPL-MCNC: 16 MG/DL
CALCIUM SERPL-MCNC: 9.9 MG/DL
CHLORIDE SERPL-SCNC: 99 MMOL/L
CHOLEST SERPL-MCNC: 270 MG/DL
CO2 SERPL-SCNC: 28 MMOL/L
CREAT SERPL-MCNC: 0.65 MG/DL
CREAT SPEC-SCNC: 36 MG/DL
EGFR: 109 ML/MIN/1.73M2
ESTIMATED AVERAGE GLUCOSE: 226 MG/DL
GLUCOSE SERPL-MCNC: 113 MG/DL
HBA1C MFR BLD HPLC: 9.5 %
HDLC SERPL-MCNC: 82 MG/DL
LDLC SERPL CALC-MCNC: 158 MG/DL
MICROALBUMIN 24H UR DL<=1MG/L-MCNC: <1.2 MG/DL
MICROALBUMIN/CREAT 24H UR-RTO: NORMAL MG/G
NONHDLC SERPL-MCNC: 188 MG/DL
POTASSIUM SERPL-SCNC: 4.1 MMOL/L
SODIUM SERPL-SCNC: 139 MMOL/L
TRIGL SERPL-MCNC: 167 MG/DL

## 2024-01-28 ENCOUNTER — TRANSCRIPTION ENCOUNTER (OUTPATIENT)
Age: 49
End: 2024-01-28

## 2024-02-06 ENCOUNTER — APPOINTMENT (OUTPATIENT)
Dept: ENDOCRINOLOGY | Facility: CLINIC | Age: 49
End: 2024-02-06

## 2024-03-07 LAB
ANION GAP SERPL CALC-SCNC: 10 MMOL/L
BUN SERPL-MCNC: 12 MG/DL
CALCIUM SERPL-MCNC: 9.5 MG/DL
CHLORIDE SERPL-SCNC: 97 MMOL/L
CHOLEST SERPL-MCNC: 196 MG/DL
CO2 SERPL-SCNC: 27 MMOL/L
CREAT SERPL-MCNC: 0.63 MG/DL
CREAT SPEC-SCNC: 29 MG/DL
EGFR: 109 ML/MIN/1.73M2
ESTIMATED AVERAGE GLUCOSE: 286 MG/DL
GLUCOSE SERPL-MCNC: 119 MG/DL
HBA1C MFR BLD HPLC: 11.6 %
HDLC SERPL-MCNC: 83 MG/DL
LDLC SERPL CALC-MCNC: 97 MG/DL
MICROALBUMIN 24H UR DL<=1MG/L-MCNC: <1.2 MG/DL
MICROALBUMIN/CREAT 24H UR-RTO: NORMAL MG/G
NONHDLC SERPL-MCNC: 113 MG/DL
POTASSIUM SERPL-SCNC: 3.9 MMOL/L
SODIUM SERPL-SCNC: 135 MMOL/L
TRIGL SERPL-MCNC: 86 MG/DL

## 2024-03-11 ENCOUNTER — APPOINTMENT (OUTPATIENT)
Dept: ENDOCRINOLOGY | Facility: CLINIC | Age: 49
End: 2024-03-11
Payer: COMMERCIAL

## 2024-03-11 VITALS
BODY MASS INDEX: 21.99 KG/M2 | HEIGHT: 65 IN | DIASTOLIC BLOOD PRESSURE: 71 MMHG | SYSTOLIC BLOOD PRESSURE: 132 MMHG | HEART RATE: 74 BPM | WEIGHT: 132 LBS

## 2024-03-11 PROCEDURE — 83036 HEMOGLOBIN GLYCOSYLATED A1C: CPT | Mod: QW

## 2024-03-11 PROCEDURE — 99214 OFFICE O/P EST MOD 30 MIN: CPT | Mod: 25

## 2024-03-11 PROCEDURE — 82962 GLUCOSE BLOOD TEST: CPT

## 2024-03-11 RX ORDER — INSULIN GLARGINE 100 [IU]/ML
100 INJECTION, SOLUTION SUBCUTANEOUS
Qty: 1 | Refills: 1 | Status: ACTIVE | COMMUNITY
Start: 2024-03-11 | End: 1900-01-01

## 2024-03-11 RX ORDER — ATORVASTATIN CALCIUM 20 MG/1
20 TABLET, FILM COATED ORAL DAILY
Qty: 90 | Refills: 2 | Status: ACTIVE | COMMUNITY
Start: 2022-10-06 | End: 1900-01-01

## 2024-03-11 RX ORDER — INSULIN GLARGINE 100 [IU]/ML
100 INJECTION, SOLUTION SUBCUTANEOUS
Qty: 1 | Refills: 2 | Status: DISCONTINUED | COMMUNITY
Start: 2021-12-09 | End: 2024-03-11

## 2024-03-11 RX ORDER — ELECTROLYTES/DEXTROSE
32G X 4 MM SOLUTION, ORAL ORAL
Qty: 100 | Refills: 7 | Status: ACTIVE | COMMUNITY
Start: 2022-10-06 | End: 1900-01-01

## 2024-03-11 RX ORDER — INSULIN LISPRO 100 U/ML
100 INJECTION, SOLUTION SUBCUTANEOUS
Qty: 1 | Refills: 2 | Status: ACTIVE | COMMUNITY
Start: 2021-12-09 | End: 1900-01-01

## 2024-03-11 NOTE — END OF VISIT
[Time Spent: ___ minutes] : I have spent [unfilled] minutes of time on the encounter. [FreeTextEntry3] :  All medical record entries made by the Scribe were at my, Dr. Jack Oliveros, direction and personally dictated by me on 03/11/2024. I have reviewed the chart and agree that the record accurately reflects my personal performance of the history, physical exam, assessment and plan. I have also personally directed, reviewed and agreed with the chart.

## 2024-03-11 NOTE — PHYSICAL EXAM
[No Acute Distress] : no acute distress [Normal Sclera/Conjunctiva] : normal sclera/conjunctiva [No Thyroid Nodules] : no palpable thyroid nodules [Thyroid Not Enlarged] : the thyroid was not enlarged [Clear to Auscultation] : lungs were clear to auscultation bilaterally [Normal Rate] : heart rate was normal [Normal S1, S2] : normal S1 and S2 [Regular Rhythm] : with a regular rhythm [No Edema] : no peripheral edema [Pedal Pulses Normal] : the pedal pulses are present [Soft] : abdomen soft [Spine Straight] : spine straight [No Stigmata of Cushings Syndrome] : no stigmata of Cushings Syndrome [Normal Strength/Tone] : muscle strength and tone were normal [Normal Gait] : normal gait [No Rash] : no rash [No Tremors] : no tremors [Normal Reflexes] : deep tendon reflexes were 2+ and symmetric [Oriented x3] : oriented to person, place, and time [Normal Outer Ear/Nose] : the ears and nose were normal in appearance [Acanthosis Nigricans] : no acanthosis nigricans [Kyphosis] : no kyphosis present

## 2024-03-11 NOTE — THERAPY
[Today's Date] : [unfilled] [Admelog] : Admelog [Lantus] : Lantus [de-identified] : 7u ac +ss: Glucose between: 101-150: 2 units, 151-200: 3 units,  201-250: 5 units,  251-300: 6 units, above 300: 8 units \par  BS <100: +0 u\par  -150: + 2 u\par  -200: + 3 u\par  -250: + 5 u\par  BS Above 250: + 7 u \par   [FreeTextEntry9] : 12 u  [FreeTextEntry7] : Atorvastatin 20 mg

## 2024-03-11 NOTE — ASSESSMENT
[Carbohydrate Consistent Diet] : carbohydrate consistent diet [Importance of Diet and Exercise] : importance of diet and exercise to improve glycemic control, achieve weight loss and improve cardiovascular health [Exercise/Effect on Glucose] : exercise/effect on glucose [Self Monitoring of Blood Glucose] : self monitoring of blood glucose [Diabetic Medications] : Risks and benefits of diabetic medications were discussed

## 2024-03-11 NOTE — ADDENDUM
[FreeTextEntry1] : I, Marciano You act solely as a scribe for Dr. Jack Oliveros on this date 03/11/2024

## 2024-03-11 NOTE — DATA REVIEWED
[FreeTextEntry1] : Scanned/Reviewed Labs:  - 03/06/24 A1c 11.6%, LDL-c 97, s. creat 0.63 - 01/07/22: s.creat 0.85, Glucose 101 (H), serum C-peptide <0.1 (1.1-4.4), Total bilirubin 1.3 (H) - 07/21/21: s.creat 0.87, Glucose 336 (H) - 05/26/21: s.creat 0.71, LDL-c 80, TSH 0.757  Imaging: - 07/30/21 Thyroid neck US: R lobe (measuring 5.2 x 1.1 x 2.0 cm, slightly heterogeneous) is without discrete nodules. Isthmus measures 0.1 cm and is normal in size. L lobe (measuring 4.5 x 1.0 x 1.3 cm, slightly heterogeneous) is without discrete nodules. Impression: normal thyroid sonogram without nodules.

## 2024-03-14 LAB
GLUCOSE BLDC GLUCOMTR-MCNC: 262
HBA1C MFR BLD HPLC: 12.2

## 2024-03-19 ENCOUNTER — APPOINTMENT (OUTPATIENT)
Dept: ENDOCRINOLOGY | Facility: CLINIC | Age: 49
End: 2024-03-19
Payer: COMMERCIAL

## 2024-03-19 VITALS
SYSTOLIC BLOOD PRESSURE: 124 MMHG | WEIGHT: 133 LBS | DIASTOLIC BLOOD PRESSURE: 65 MMHG | BODY MASS INDEX: 22.13 KG/M2 | HEART RATE: 79 BPM

## 2024-03-19 DIAGNOSIS — E78.5 HYPERLIPIDEMIA, UNSPECIFIED: ICD-10-CM

## 2024-03-19 PROCEDURE — 99214 OFFICE O/P EST MOD 30 MIN: CPT | Mod: 25

## 2024-03-19 PROCEDURE — 82962 GLUCOSE BLOOD TEST: CPT

## 2024-03-20 RX ORDER — FLASH GLUCOSE SENSOR
KIT MISCELLANEOUS
Qty: 6 | Refills: 1 | Status: ACTIVE | COMMUNITY
Start: 2021-08-06 | End: 1900-01-01

## 2024-03-20 NOTE — END OF VISIT
[FreeTextEntry3] : I personally performed the services described in the documentation, reviewed the documentation recorded by the scribe in my presence, and it accurately and completely records my words and actions.    [Time Spent: ___ minutes] : I have spent [unfilled] minutes of time on the encounter.

## 2024-03-20 NOTE — ADDENDUM
[FreeTextEntry1] : CHIQUITA, Leila Velarde, am scribing for an in the presence of  Dr. Jack Oliveros on this date in the following sections HISTORY OF PRESENT ILLNESS, PAST MEDICAL/FAMILY/SOCIAL HISTORY; REVIEW OF SYSTEMS; VITAL SIGNS; PHYSICAL EXAM; ASSESSMENT/PLAN.

## 2024-03-20 NOTE — THERAPY
[Today's Date] : [unfilled] [Lantus] : Lantus [Admelog] : Admelog [de-identified] : 7u ac +ss:  BS <100: +0 u -150: + 2 u -200: + 3 u -250: + 5 u BS Above 250: + 6 u -300: +8 u Higher than 300: +10 u [FreeTextEntry9] : 12 u  [FreeTextEntry7] : Atorvastatin 20 mg

## 2024-03-20 NOTE — PHYSICAL EXAM
[No Acute Distress] : no acute distress [Normal Sclera/Conjunctiva] : normal sclera/conjunctiva [Normal Outer Ear/Nose] : the ears and nose were normal in appearance [Thyroid Not Enlarged] : the thyroid was not enlarged [No Thyroid Nodules] : no palpable thyroid nodules Minocycline Counseling: Patient advised regarding possible photosensitivity and discoloration of the teeth, skin, lips, tongue and gums.  Patient instructed to avoid sunlight, if possible.  When exposed to sunlight, patients should wear protective clothing, sunglasses, and sunscreen.  The patient was instructed to call the office immediately if the following severe adverse effects occur:  hearing changes, easy bruising/bleeding, severe headache, or vision changes.  The patient verbalized understanding of the proper use and possible adverse effects of minocycline.  All of the patient's questions and concerns were addressed. [Clear to Auscultation] : lungs were clear to auscultation bilaterally [Normal S1, S2] : normal S1 and S2 [Normal Rate] : heart rate was normal [Regular Rhythm] : with a regular rhythm [No Edema] : no peripheral edema [Pedal Pulses Normal] : the pedal pulses are present [Soft] : abdomen soft [Spine Straight] : spine straight [No Stigmata of Cushings Syndrome] : no stigmata of Cushings Syndrome [Normal Gait] : normal gait [Normal Strength/Tone] : muscle strength and tone were normal [No Rash] : no rash [Normal Reflexes] : deep tendon reflexes were 2+ and symmetric [No Tremors] : no tremors [Oriented x3] : oriented to person, place, and time [Kyphosis] : no kyphosis present [Acanthosis Nigricans] : no acanthosis nigricans

## 2024-04-06 LAB — GLUCOSE BLDC GLUCOMTR-MCNC: 231

## 2024-04-09 RX ORDER — DULAGLUTIDE 0.75 MG/.5ML
0.75 INJECTION, SOLUTION SUBCUTANEOUS
Qty: 4 | Refills: 2 | Status: ACTIVE | COMMUNITY
Start: 2022-11-22 | End: 1900-01-01

## 2024-04-15 ENCOUNTER — APPOINTMENT (OUTPATIENT)
Dept: ENDOCRINOLOGY | Facility: CLINIC | Age: 49
End: 2024-04-15
Payer: COMMERCIAL

## 2024-04-15 VITALS
SYSTOLIC BLOOD PRESSURE: 119 MMHG | HEART RATE: 73 BPM | HEIGHT: 65 IN | DIASTOLIC BLOOD PRESSURE: 71 MMHG | BODY MASS INDEX: 22.16 KG/M2 | WEIGHT: 133 LBS

## 2024-04-15 DIAGNOSIS — E11.65 TYPE 2 DIABETES MELLITUS WITH HYPERGLYCEMIA: ICD-10-CM

## 2024-04-15 LAB
GLUCOSE BLDC GLUCOMTR-MCNC: 130
HBA1C MFR BLD HPLC: 11.3

## 2024-04-15 PROCEDURE — 82962 GLUCOSE BLOOD TEST: CPT

## 2024-04-15 PROCEDURE — 83036 HEMOGLOBIN GLYCOSYLATED A1C: CPT | Mod: QW

## 2024-04-15 PROCEDURE — 95251 CONT GLUC MNTR ANALYSIS I&R: CPT

## 2024-04-15 PROCEDURE — 99214 OFFICE O/P EST MOD 30 MIN: CPT | Mod: 25

## 2024-04-16 PROBLEM — E11.65 POORLY CONTROLLED TYPE 2 DIABETES MELLITUS: Status: ACTIVE | Noted: 2022-06-15

## 2024-04-16 NOTE — REVIEW OF SYSTEMS
[Negative] : Endocrine [Nausea] : no nausea [Vomiting] : no vomiting [de-identified] : Reports discoloration of lower extremities in speckled pattern

## 2024-04-16 NOTE — PHYSICAL EXAM
[Alert] : alert [No Acute Distress] : no acute distress [Normal Sclera/Conjunctiva] : normal sclera/conjunctiva [Normal Outer Ear/Nose] : the ears and nose were normal in appearance [Normal Lips/Gums] : the lips and gums were normal [No Neck Mass] : no neck mass was observed [No Respiratory Distress] : no respiratory distress [No Accessory Muscle Use] : no accessory muscle use [Normal Rate and Effort] : normal respiratory rate and effort [Clear to Auscultation] : lungs were clear to auscultation bilaterally [Normal S1, S2] : normal S1 and S2 [No Murmurs] : no murmurs [Normal Rate] : heart rate was normal [Pedal Pulses Normal] : the pedal pulses are present [Normal Bowel Sounds] : normal bowel sounds [Not Tender] : non-tender [Soft] : abdomen soft [No Stigmata of Cushings Syndrome] : no stigmata of Cushings Syndrome [Normal Gait] : normal gait [No Joint Swelling] : no joint swelling seen [Oriented x3] : oriented to person, place, and time [Normal Insight/Judgement] : insight and judgment were intact [No Edema] : no peripheral edema [de-identified] : 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

## 2024-04-16 NOTE — HISTORY OF PRESENT ILLNESS
[FreeTextEntry1] : Yanely Covarrubias is a 48 year old female who presents for follow up on Type 2 DM management.   DM Hx; Diagnosed with Type 2 DM at age 22, has been on insulin since ~.  DM related complications: peripheral neuropathy and "proteinuria" and thyroid nodules Home BG monitoring: FSL2 Last Podiatry: 2023 Last Ophthalmology: 2022, no retinopathy  PMHx: UTI (2021), cellulitis in left foot (2022), COVID (2022), No hx CAD PSHx:  () FHx: DM (uncle), denies family hx thyroid disorders  NKDA   Reviewed A1c history, pt was 12.2% on 2024, POCT A1c 11.3% in office today.  Pt is happy with this improvement, but wishes she knew why BG increases even if she does not eat.  States her glucose was fairly variable when she had the flu in late March-early April, but it has improved. She is feeling better since having the flu.  Reports feeling like she tolerates Trulicity well, denies nausea, vomiting.   Describes stressful lifestyle recently, with frequent medical appts. She had suspicious mammogram with biopsy of left breast lesion (benign, per pt), and needs follow up CT scan and ultrasound.   Current medication regimen:  -- Lantus 12 units -- Admelog 7 units with meals + sliding scale (pt reports taking 7-10 units with meals) -- Trulicity 0.75mg/weekly  Yanely measures blood glucose via Freestyle Sha continuous glucose monitor: Report from 2024 - 4/15/24 downloaded and reviewed.   Time in range: 45%  Time above range: 180 - 250 mg/dL: 25%  >250 mg/dL: 27% Time below range: 54 - 69 mg/dL: 3% <54 mg/dL: 0% Glucose variability: 42.5% Avg glucose: 195 mg/dL, GMI 8.0%  Review of glycemic trends over the past two weeks reveals persistently "high-very high" glucose range level occurring from ~3-11pm. Mild hypoglycemia occurring overnight around 2-3am. Significant glucose variability occurring from 1-4pm (range 70 - >350mg/dL).  Diet: Breakfast is at 7am: Eats toast, coffee w/1 sugar, or a chocolate or oats. Takes Admelog 7-10 units Lunch is around noon: Eats vegetables, rice, beans, meat or fish. Takes Admelog 7-10 units Dinner is around 7-8pm: Eats sweet potato, plantain, sometimes with salami, or egg or cheese. Takes Admelog 7-10 units Sometimes drinks juice, no soda.   Exercise: Reports she walks, but does not exercise otherwise  Current Meds: Lantus 14 units, Admelog 5/10/10, Trulicity 0.75mg/weekly, Atorvastatin 20mg, Cymbalta 30mg, Ramipril, Norethindrone 0.35mg (OCP, noted 6/15/2022), ASA, Duloxetine 30mg

## 2024-07-15 ENCOUNTER — TRANSCRIPTION ENCOUNTER (OUTPATIENT)
Age: 49
End: 2024-07-15

## 2024-07-22 ENCOUNTER — APPOINTMENT (OUTPATIENT)
Dept: ENDOCRINOLOGY | Facility: CLINIC | Age: 49
End: 2024-07-22
Payer: COMMERCIAL

## 2024-07-22 VITALS
HEART RATE: 76 BPM | WEIGHT: 126 LBS | DIASTOLIC BLOOD PRESSURE: 69 MMHG | BODY MASS INDEX: 20.97 KG/M2 | SYSTOLIC BLOOD PRESSURE: 113 MMHG

## 2024-07-22 DIAGNOSIS — E11.65 TYPE 2 DIABETES MELLITUS WITH HYPERGLYCEMIA: ICD-10-CM

## 2024-07-22 PROCEDURE — 99214 OFFICE O/P EST MOD 30 MIN: CPT

## 2024-07-22 PROCEDURE — G2211 COMPLEX E/M VISIT ADD ON: CPT

## 2024-07-22 PROCEDURE — 83036 HEMOGLOBIN GLYCOSYLATED A1C: CPT | Mod: QW

## 2024-07-22 RX ORDER — INSULIN ASPART 100 [IU]/ML
100 INJECTION, SOLUTION INTRAVENOUS; SUBCUTANEOUS
Qty: 1 | Refills: 1 | Status: ACTIVE | COMMUNITY
Start: 2024-07-22 | End: 1900-01-01

## 2024-07-22 RX ORDER — INSULIN GLARGINE 100 [IU]/ML
100 INJECTION, SOLUTION SUBCUTANEOUS
Qty: 9 | Refills: 2 | Status: ACTIVE | COMMUNITY
Start: 2024-07-22 | End: 1900-01-01

## 2024-07-23 NOTE — THERAPY
[Today's Date] : [unfilled] [Lantus] : Lantus [Admelog] : Admelog [FreeTextEntry9] : 12 u  [de-identified] : 7u ac +ss: < 80 0u,  2u, 101-150 3u, 151-200 5u, 201-250 6u, 251-300 8u, > 300 10u, BS <100: +0 u -150: + 2 u -200: + 3 u -250: + 5 u BS Above 250: + 6 u -300: +8 u Higher than 300: +10 u [FreeTextEntry7] : Atorvastatin 20 mg

## 2024-07-23 NOTE — END OF VISIT
[FreeTextEntry3] :  All medical record entries made by the Scribe were at my, Dr. Jack Oliveros, direction and personally dictated by me on 07/22/2024. I have reviewed the chart and agree that the record accurately reflects my personal performance of the history, physical exam, assessment and plan. I have also personally directed, reviewed and agreed with the chart. [Time Spent: ___ minutes] : I have spent [unfilled] minutes of time on the encounter.

## 2024-07-23 NOTE — ADDENDUM
[FreeTextEntry1] : I, Marciano You act solely as a scribe for Dr. Jack Oliveros on this date 07/22/2024

## 2024-07-23 NOTE — PHYSICAL EXAM
[No Acute Distress] : no acute distress [Normal Sclera/Conjunctiva] : normal sclera/conjunctiva [Normal Outer Ear/Nose] : the ears and nose were normal in appearance [Thyroid Not Enlarged] : the thyroid was not enlarged [No Thyroid Nodules] : no palpable thyroid nodules [Clear to Auscultation] : lungs were clear to auscultation bilaterally [Normal S1, S2] : normal S1 and S2 [Normal Rate] : heart rate was normal [Regular Rhythm] : with a regular rhythm [No Edema] : no peripheral edema [Pedal Pulses Normal] : the pedal pulses are present [Soft] : abdomen soft [Spine Straight] : spine straight [No Stigmata of Cushings Syndrome] : no stigmata of Cushings Syndrome [Normal Gait] : normal gait [Normal Strength/Tone] : muscle strength and tone were normal [No Rash] : no rash [Normal Reflexes] : deep tendon reflexes were 2+ and symmetric [No Tremors] : no tremors [Oriented x3] : oriented to person, place, and time [Kyphosis] : no kyphosis present [Acanthosis Nigricans] : no acanthosis nigricans

## 2024-07-23 NOTE — THERAPY
[Today's Date] : [unfilled] [Lantus] : Lantus [Admelog] : Admelog [FreeTextEntry9] : 12 u  [de-identified] : 7u ac +ss: < 80 0u,  2u, 101-150 3u, 151-200 5u, 201-250 6u, 251-300 8u, > 300 10u, BS <100: +0 u -150: + 2 u -200: + 3 u -250: + 5 u BS Above 250: + 6 u -300: +8 u Higher than 300: +10 u [FreeTextEntry7] : Atorvastatin 20 mg

## 2024-08-01 LAB
GLUCOSE BLDC GLUCOMTR-MCNC: 205
HBA1C MFR BLD HPLC: 10

## 2024-11-09 DIAGNOSIS — E11.65 TYPE 2 DIABETES MELLITUS WITH HYPERGLYCEMIA: ICD-10-CM

## 2024-11-11 ENCOUNTER — TRANSCRIPTION ENCOUNTER (OUTPATIENT)
Age: 49
End: 2024-11-11

## 2024-11-19 ENCOUNTER — APPOINTMENT (OUTPATIENT)
Dept: ENDOCRINOLOGY | Facility: CLINIC | Age: 49
End: 2024-11-19
Payer: COMMERCIAL

## 2024-11-19 VITALS
HEART RATE: 85 BPM | HEIGHT: 65 IN | DIASTOLIC BLOOD PRESSURE: 72 MMHG | SYSTOLIC BLOOD PRESSURE: 126 MMHG | WEIGHT: 137 LBS | BODY MASS INDEX: 22.82 KG/M2

## 2024-11-19 DIAGNOSIS — E11.65 TYPE 2 DIABETES MELLITUS WITH HYPERGLYCEMIA: ICD-10-CM

## 2024-11-19 LAB — GLUCOSE BLDC GLUCOMTR-MCNC: 113

## 2024-11-19 PROCEDURE — 82962 GLUCOSE BLOOD TEST: CPT

## 2024-11-19 PROCEDURE — G2211 COMPLEX E/M VISIT ADD ON: CPT | Mod: NC

## 2024-11-19 PROCEDURE — 99214 OFFICE O/P EST MOD 30 MIN: CPT

## 2025-03-12 ENCOUNTER — APPOINTMENT (OUTPATIENT)
Dept: ENDOCRINOLOGY | Facility: CLINIC | Age: 50
End: 2025-03-12
Payer: COMMERCIAL

## 2025-03-12 VITALS
WEIGHT: 127 LBS | SYSTOLIC BLOOD PRESSURE: 120 MMHG | DIASTOLIC BLOOD PRESSURE: 68 MMHG | BODY MASS INDEX: 21.13 KG/M2 | HEART RATE: 78 BPM

## 2025-03-12 PROCEDURE — 83036 HEMOGLOBIN GLYCOSYLATED A1C: CPT | Mod: QW

## 2025-03-12 PROCEDURE — 82962 GLUCOSE BLOOD TEST: CPT

## 2025-03-12 PROCEDURE — 99214 OFFICE O/P EST MOD 30 MIN: CPT

## 2025-03-12 PROCEDURE — 36415 COLL VENOUS BLD VENIPUNCTURE: CPT

## 2025-03-19 ENCOUNTER — APPOINTMENT (OUTPATIENT)
Dept: ENDOCRINOLOGY | Facility: CLINIC | Age: 50
End: 2025-03-19
Payer: COMMERCIAL

## 2025-03-19 VITALS
WEIGHT: 129 LBS | DIASTOLIC BLOOD PRESSURE: 70 MMHG | BODY MASS INDEX: 21.49 KG/M2 | HEIGHT: 65 IN | HEART RATE: 81 BPM | SYSTOLIC BLOOD PRESSURE: 122 MMHG

## 2025-03-19 DIAGNOSIS — E11.65 TYPE 2 DIABETES MELLITUS WITH HYPERGLYCEMIA: ICD-10-CM

## 2025-03-19 LAB
ANION GAP SERPL CALC-SCNC: 9 MMOL/L
BUN SERPL-MCNC: 8 MG/DL
C PEPTIDE SERPL-MCNC: <0.1 NG/ML
CALCIUM SERPL-MCNC: 10.3 MG/DL
CHLORIDE SERPL-SCNC: 99 MMOL/L
CHOLEST SERPL-MCNC: 190 MG/DL
CO2 SERPL-SCNC: 31 MMOL/L
CREAT SERPL-MCNC: 0.67 MG/DL
CREAT SPEC-SCNC: 54 MG/DL
EGFRCR SERPLBLD CKD-EPI 2021: 106 ML/MIN/1.73M2
ESTIMATED AVERAGE GLUCOSE: 229 MG/DL
GLUCOSE BLDC GLUCOMTR-MCNC: 79
GLUCOSE SERPL-MCNC: 76 MG/DL
HBA1C MFR BLD HPLC: 9
HBA1C MFR BLD HPLC: 9.6 %
HDLC SERPL-MCNC: 79 MG/DL
LDLC SERPL CALC-MCNC: 96 MG/DL
MICROALBUMIN 24H UR DL<=1MG/L-MCNC: <1.2 MG/DL
MICROALBUMIN/CREAT 24H UR-RTO: NORMAL MG/G
NONHDLC SERPL-MCNC: 111 MG/DL
POTASSIUM SERPL-SCNC: 4.4 MMOL/L
SODIUM SERPL-SCNC: 140 MMOL/L
TRIGL SERPL-MCNC: 84 MG/DL

## 2025-03-19 PROCEDURE — 99214 OFFICE O/P EST MOD 30 MIN: CPT

## 2025-03-19 RX ORDER — DULAGLUTIDE 1.5 MG/.5ML
1.5 INJECTION, SOLUTION SUBCUTANEOUS
Qty: 1 | Refills: 2 | Status: ACTIVE | COMMUNITY
Start: 2025-03-19 | End: 1900-01-01

## 2025-05-31 ENCOUNTER — NON-APPOINTMENT (OUTPATIENT)
Age: 50
End: 2025-05-31

## 2025-06-02 ENCOUNTER — APPOINTMENT (OUTPATIENT)
Dept: ENDOCRINOLOGY | Facility: CLINIC | Age: 50
End: 2025-06-02

## 2025-06-02 DIAGNOSIS — E78.5 HYPERLIPIDEMIA, UNSPECIFIED: ICD-10-CM

## 2025-06-02 DIAGNOSIS — E11.65 TYPE 2 DIABETES MELLITUS WITH HYPERGLYCEMIA: ICD-10-CM

## 2025-06-02 PROCEDURE — 99214 OFFICE O/P EST MOD 30 MIN: CPT | Mod: 95
